# Patient Record
Sex: MALE | Race: BLACK OR AFRICAN AMERICAN | NOT HISPANIC OR LATINO | Employment: STUDENT | URBAN - METROPOLITAN AREA
[De-identification: names, ages, dates, MRNs, and addresses within clinical notes are randomized per-mention and may not be internally consistent; named-entity substitution may affect disease eponyms.]

---
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
## 2022-06-03 ENCOUNTER — HOSPITAL ENCOUNTER (EMERGENCY)
Facility: HOSPITAL | Age: 22
Discharge: HOME | End: 2022-06-03
Attending: EMERGENCY MEDICINE
Payer: MEDICAID

## 2022-06-03 VITALS
BODY MASS INDEX: 27.4 KG/M2 | HEART RATE: 74 BPM | RESPIRATION RATE: 16 BRPM | SYSTOLIC BLOOD PRESSURE: 130 MMHG | WEIGHT: 225 LBS | TEMPERATURE: 98 F | DIASTOLIC BLOOD PRESSURE: 83 MMHG | HEIGHT: 76 IN | OXYGEN SATURATION: 100 %

## 2022-06-03 DIAGNOSIS — R30.0 DYSURIA: Primary | ICD-10-CM

## 2022-06-03 DIAGNOSIS — Z20.2 POSSIBLE EXPOSURE TO STD: ICD-10-CM

## 2022-06-03 LAB
BILIRUB UR QL STRIP: NEGATIVE
CLARITY UR: ABNORMAL
COLOR UR: YELLOW
GLUCOSE UR QL STRIP: NEGATIVE
HGB UR QL STRIP: NEGATIVE
KETONES UR QL STRIP: NEGATIVE
LEUKOCYTE ESTERASE UR QL STRIP: NEGATIVE
NITRITE UR QL STRIP: NEGATIVE
PH UR STRIP: 7 [PH] (ref 5–8)
PROT UR QL STRIP: ABNORMAL
SP GR UR STRIP: 1.02 (ref 1–1.03)
URN SPEC COLLECT METH UR: ABNORMAL
UROBILINOGEN UR STRIP-ACNC: NEGATIVE EU/DL

## 2022-06-03 PROCEDURE — 63700000 PHARM REV CODE 250 ALT 637 W/O HCPCS: Performed by: NURSE PRACTITIONER

## 2022-06-03 PROCEDURE — 81003 URINALYSIS AUTO W/O SCOPE: CPT | Performed by: NURSE PRACTITIONER

## 2022-06-03 PROCEDURE — 99284 EMERGENCY DEPT VISIT MOD MDM: CPT

## 2022-06-03 PROCEDURE — 87491 CHLMYD TRACH DNA AMP PROBE: CPT | Performed by: NURSE PRACTITIONER

## 2022-06-03 PROCEDURE — 63600175 PHARM REV CODE 636 W HCPCS: Performed by: NURSE PRACTITIONER

## 2022-06-03 PROCEDURE — 96372 THER/PROPH/DIAG INJ SC/IM: CPT | Performed by: NURSE PRACTITIONER

## 2022-06-03 PROCEDURE — 87591 N.GONORRHOEAE DNA AMP PROB: CPT | Performed by: NURSE PRACTITIONER

## 2022-06-03 RX ORDER — AZITHROMYCIN 250 MG/1
1000 TABLET, FILM COATED ORAL
Status: COMPLETED | OUTPATIENT
Start: 2022-06-03 | End: 2022-06-03

## 2022-06-03 RX ORDER — DOXYCYCLINE 100 MG/1
100 CAPSULE ORAL 2 TIMES DAILY
Qty: 20 CAPSULE | Refills: 0 | Status: SHIPPED | OUTPATIENT
Start: 2022-06-03 | End: 2022-06-13

## 2022-06-03 RX ORDER — CEFTRIAXONE 1 G/1
0.5 INJECTION, POWDER, FOR SOLUTION INTRAMUSCULAR; INTRAVENOUS
Status: COMPLETED | OUTPATIENT
Start: 2022-06-03 | End: 2022-06-03

## 2022-06-03 RX ADMIN — AZITHROMYCIN MONOHYDRATE 1000 MG: 250 TABLET ORAL at 07:06

## 2022-06-03 RX ADMIN — CEFTRIAXONE 0.5 G: 1 INJECTION, POWDER, FOR SOLUTION INTRAMUSCULAR; INTRAVENOUS at 07:06

## 2022-06-03 NOTE — Clinical Note
"Don'Trent "Umesh'Trent" Noman was seen and treated in our emergency department on 6/3/2022.  He may return to work on 06/06/2022.       If you have any questions or concerns, please don't hesitate to call.      Meredith Roche NP"

## 2022-06-03 NOTE — ED PROVIDER NOTES
Encounter Date: 6/3/2022       History     Chief Complaint   Patient presents with    Urinary Tract Infection     Poss UTI v/s STD     21-year-old male with no previous medical history presents to the ER with complaints of dysuria, noticing some very light intermittent penile discharge described as clear to light yellow in color, and concern for possible STD exposure.  He states he has been having unprotected intercourse with a female partner.  No genital rash.  No hematuria.  No decreased urinary output.  Also denies any testicular pain or swelling.  No fever, no previous medical history, no other complaints or concerns including no abdominal pain nausea vomiting diarrhea or other symptoms.  He states he would like to be checked for STDs.        Review of patient's allergies indicates:  No Known Allergies  Past Medical History:   Diagnosis Date    ADHD (attention deficit hyperactivity disorder)     Allergy     Headache(784.0)     occassional    Meningitis     current     Past Surgical History:   Procedure Laterality Date    ADENOIDECTOMY      TONSILLECTOMY      TONSILLECTOMY, ADENOIDECTOMY, BILATERAL MYRINGOTOMY AND TUBES  12/27/2006     Family History   Problem Relation Age of Onset    Cancer Maternal Grandmother     Diabetes Maternal Grandmother     Kidney disease Maternal Grandmother     Congenital heart disease Neg Hx     Cardiomyopathy Neg Hx     Early death Neg Hx     Long QT syndrome Neg Hx     SIDS Neg Hx     Heart attacks under age 50 Neg Hx      Social History     Tobacco Use    Smoking status: Never Smoker    Smokeless tobacco: Never Used   Substance Use Topics    Alcohol use: No    Drug use: No     Review of Systems   Constitutional: Negative for chills, diaphoresis, fatigue and fever.   HENT: Negative for sore throat.    Eyes: Negative for photophobia and visual disturbance.   Respiratory: Negative for cough, chest tightness, shortness of breath, wheezing and stridor.     Cardiovascular: Negative for chest pain, palpitations and leg swelling.   Gastrointestinal: Negative for abdominal pain, constipation, diarrhea, nausea and vomiting.   Endocrine: Negative for polydipsia, polyphagia and polyuria.   Genitourinary: Positive for dysuria and penile discharge. Negative for decreased urine volume, difficulty urinating, flank pain, frequency, hematuria, penile swelling, scrotal swelling, testicular pain and urgency.   Musculoskeletal: Negative for arthralgias, back pain, gait problem, myalgias, neck pain and neck stiffness.   Skin: Negative for color change, rash and wound.   Allergic/Immunologic: Negative for immunocompromised state.   Neurological: Negative for dizziness, seizures, syncope, speech difficulty, weakness, light-headedness and headaches.   Hematological: Does not bruise/bleed easily.   Psychiatric/Behavioral: Negative for agitation and confusion.   All other systems reviewed and are negative.      Physical Exam     Initial Vitals [06/03/22 1801]   BP Pulse Resp Temp SpO2   130/83 74 16 98.4 °F (36.9 °C) 100 %      MAP       --         Physical Exam    Nursing note and vitals reviewed.  Constitutional: He appears well-developed and well-nourished. He is not diaphoretic. No distress.   HENT:   Head: Normocephalic and atraumatic.   Right Ear: External ear normal.   Left Ear: External ear normal.   Nose: Nose normal.   Mouth/Throat: Oropharynx is clear and moist. No oropharyngeal exudate.   Eyes: Conjunctivae are normal. Pupils are equal, round, and reactive to light.   Neck: Neck supple.   Normal range of motion.  Cardiovascular: Normal rate.   No murmur heard.  Pulmonary/Chest: Breath sounds normal. He has no wheezes. He has no rhonchi. He has no rales.   Abdominal: Abdomen is soft. Bowel sounds are normal. There is no abdominal tenderness.   Genitourinary:    Penis normal.   No discharge found.   Musculoskeletal:         General: No tenderness or edema. Normal range of  motion.      Cervical back: Normal range of motion and neck supple.     Neurological: He is alert and oriented to person, place, and time. He has normal strength. GCS score is 15. GCS eye subscore is 4. GCS verbal subscore is 5. GCS motor subscore is 6.   Skin: Skin is warm and dry. Capillary refill takes less than 2 seconds. No rash noted. No erythema.   Psychiatric: He has a normal mood and affect. Thought content normal.         ED Course   Procedures   Labs Reviewed   C. TRACHOMATIS/N. GONORRHOEAE BY AMP DNA   URINALYSIS, REFLEX TO URINE CULTURE       Results for orders placed or performed during the hospital encounter of 06/03/22   Urinalysis, Reflex to Urine Culture Urine, Clean Catch    Specimen: Urine   Result Value Ref Range    Specimen UA Urine, Clean Catch     Color, UA Yellow Yellow, Straw, Lucila    Appearance, UA Hazy (A) Clear    pH, UA 7.0 5.0 - 8.0    Specific Gravity, UA 1.020 1.005 - 1.030    Protein, UA Trace (A) Negative    Glucose, UA Negative Negative    Ketones, UA Negative Negative    Bilirubin (UA) Negative Negative    Occult Blood UA Negative Negative    Nitrite, UA Negative Negative    Urobilinogen, UA Negative Negative EU/dL    Leukocytes, UA Negative Negative            Imaging Results    None          Medications   cefTRIAXone injection 0.5 g (has no administration in time range)   azithromycin tablet 1,000 mg (has no administration in time range)     Medical Decision Making:   ED Management:  Based off of patient's complaints, age, and risk factors, we will treat empirically for common STDs including gonorrhea and chlamydia.  500 mg Rocephin IM administered along with 1 g of azithromycin.  Will also prescribed 100 mg doxycycline b.i.d. for 10 days and instruct on refraining from sexual intercourse to prevent spreading and possible worsening symptoms, follow up with his PCP on Monday for re-evaluation and further STD screening and ER visit follow-up, and to return to the ER for any new  worsening symptoms.  He appears nontoxic in no acute distress.  Will send off a dirty urine specimen and call patient if the results are positive for gonorrhea chlamydia so he can and be aware of his results but otherwise has been empirically treated.  ER return precautions discussed in detail he understands he should return for any new worsening symptoms.                       Clinical Impression:   Final diagnoses:  [R30.0] Dysuria (Primary)  [Z20.2] Possible exposure to STD          ED Disposition Condition    Discharge Stable        ED Prescriptions     Medication Sig Dispense Start Date End Date Auth. Provider    doxycycline (VIBRAMYCIN) 100 MG Cap Take 1 capsule (100 mg total) by mouth 2 (two) times daily. for 10 days 20 capsule 6/3/2022 6/13/2022 Meredith Roche NP        Follow-up Information     Follow up With Specialties Details Why Contact Info Additional Information    Access [facility]  Schedule an appointment as soon as possible for a visit in 3 days for ER visit follow up and re-evaluation [address] [facility] [address]  [phone]       [facility] - Emergency Dept Emergency Medicine Go to  As needed, If symptoms worsen [address] [facility] [address]  [phone] 1st floor           Meredith Roche NP  06/04/22 0110

## 2022-06-07 LAB
CHLAMYDIA, AMPLIFIED DNA: NEGATIVE
N GONORRHOEAE, AMPLIFIED DNA: POSITIVE

## 2022-06-10 ENCOUNTER — HOSPITAL ENCOUNTER (EMERGENCY)
Facility: HOSPITAL | Age: 22
Discharge: HOME | End: 2022-06-10
Attending: EMERGENCY MEDICINE
Payer: MEDICAID

## 2022-06-10 VITALS
OXYGEN SATURATION: 97 % | SYSTOLIC BLOOD PRESSURE: 114 MMHG | HEART RATE: 73 BPM | DIASTOLIC BLOOD PRESSURE: 77 MMHG | BODY MASS INDEX: 27.39 KG/M2 | RESPIRATION RATE: 17 BRPM | TEMPERATURE: 98 F | WEIGHT: 225 LBS

## 2022-06-10 DIAGNOSIS — M79.675 PAIN OF TOE OF LEFT FOOT: Primary | ICD-10-CM

## 2022-06-10 DIAGNOSIS — S99.922A INJURY OF TOE ON LEFT FOOT, INITIAL ENCOUNTER: ICD-10-CM

## 2022-06-10 PROCEDURE — 99283 EMERGENCY DEPT VISIT LOW MDM: CPT

## 2022-06-10 NOTE — ED PROVIDER NOTES
Source of History:  Patient    Chief complaint:  Toe Injury (L big toe injury- stubbed toe playing basketball)      HPI:  Dejah Tineo is a 21 y.o. male presenting with  left great toe injury.  Patient states he was playing basketball when he stubbed his left great toe.  Patient denies taking anything for his pain.    This is the extent to the patients complaints today here in the emergency department.    ROS: As per HPI and below:  Constitutional: No fever.  No chills.  Eyes: No visual changes.   ENT: No sore throat. No ear pain.  Urinary: No abnormal urination.  MSK:  Positive for left foot pain   Integument: No rashes or lesions.    Review of patient's allergies indicates:  No Known Allergies    PMH:  As per HPI and below:  Past Medical History:   Diagnosis Date    ADHD (attention deficit hyperactivity disorder)     Allergy     Headache(784.0)     occassional    Meningitis     current     Past Surgical History:   Procedure Laterality Date    ADENOIDECTOMY      TONSILLECTOMY      TONSILLECTOMY, ADENOIDECTOMY, BILATERAL MYRINGOTOMY AND TUBES  12/27/2006       Social History     Tobacco Use    Smoking status: Never Smoker    Smokeless tobacco: Never Used   Substance Use Topics    Alcohol use: No    Drug use: No       Physical Exam:    /77   Pulse 73   Temp 98.3 °F (36.8 °C) (Oral)   Resp 17   Wt 102.1 kg (225 lb)   SpO2 97%   BMI 27.39 kg/m²   Nursing note and vital signs reviewed.  Constitutional: No acute distress.  Nontoxic  Head:  Normocephalic atraumatic  Eyes: No conjunctival injection.  Extraocular muscles are intact.  ENT: Normal phonation.  Musculoskeletal: Left great toe pain to palpation.  No swelling or ecchymosis noted.  Neurovascularly intact.  Able to move all toes.  Steady gait appreciated.  Skin: No rashes seen.  Good turgor.  No abrasions.  No ecchymoses.  Psych: Appropriate, conversant.    Imaging Results          X-Ray Toe 2 or More Views Left (Final result)  Result  time 06/10/22 17:43:16    Final result by Priyank Whitaker Jr., MD (06/10/22 17:43:16)                 Narrative:    XR TOES 2 OR MORE VIEWS    LEFT GREAT TOE X-RAY-3 VIEW(S)    HISTORY: Toe injury    FINDINGS:  The osseous structures appear intact without evidence of an acute fracture deformity.  No significant metabolic, inflammatory, nor neoplastic process is demonstrated.  Soft tissues appear within the range of normal.      IMPRESSION: NEGATIVE STUDY    Electronically signed by:  Priyank Whitaker MD  6/10/2022 5:43 PM CDT Workstation: [id number]                                I decided to obtain the patient's medical records.      MDM/ Differential Dx:   Emergent evaluation of a 22 yo male presenting for left great toe pain.  Patient states he injured his toe while playing basketball earlier today.  Patient denies taking anything for his pain.  On exam pt is A&Ox3. VSS. Nonfebrile and nontoxic appearing. Breath sounds clear bilaterally. Mucous membranes pink and moist. Tonsils with no redness, erythema or exudates. Abdomen soft and nontender. No rebound or guarding appreciated on exam.   BS WNL.  Pt speaking in full sentences.  Mild tenderness to palpation to left great toe.  Good extension flexion noted.  Neurovascularly intact.  Steady gait appreciated. Cap refill < 3 seconds.      Differential diagnoses include but are not limited to sprain, strain, contusion, abrasion, fracture, dislocation.    I will get imaging and reassess.        ED Course as of 06/10/22 1759   Fri Ismael 10, 2022   1755 X-ray negative for any acute abnormalities.  Patient reassessed.  Patient updated on imaging results.  Advised take Tylenol or ibuprofen as needed for pain.  Ice for swelling.  Keep foot elevated.  Follow-up with PCP as needed.  Patient verbalized understanding of this plan of care.  All questions and concerns addressed. [RZ]   1759 Patient is hemodynamically stable, vital signs are normal. Discharge instructions given.  Return to ED precautions discussed. Follow up as directed. Pt verbalized understanding of this plan.  Pt is stable for discharge.  [RZ]      ED Course User Index  [RZ] Gudelia Tyler NP               Diagnostic Impression:    1. Pain of toe of left foot    2. Injury of toe on left foot, initial encounter         ED Disposition Condition    Discharge Stable          ED Prescriptions     None        Follow-up Information     Follow up With Specialties Details Why Contact [facility]  Schedule an appointment as soon as possible for a visit  as needed [address] [place] [address]  [phone]               Gudelia Tyler NP  06/10/22 5932

## 2022-06-10 NOTE — DISCHARGE INSTRUCTIONS
You were seen and evaluated in the ER today.  Your workup while you were here is reassuring for no fracture or dislocation.  You likely had a contusion to your toe.  You can take Tylenol or ibuprofen as needed for pain.  Please follow-up with your PCP as needed.  Please return to the ED for any worsening symptoms such as chest pain, shortness of breath, fever not controlled with Tylenol or ibuprofen or uncontrolled pain.      Our goal in the emergency department is to always give you outstanding care and exceptional service. You may receive a survey by mail or e-mail in the next week regarding your experience in our ED. We would greatly appreciate your completing and returning the survey. Your feedback provides us with a way to recognize our staff who give very good care and it helps us learn how to improve when your experience was below our aspiration of excellence.